# Patient Record
Sex: MALE | Race: OTHER
[De-identification: names, ages, dates, MRNs, and addresses within clinical notes are randomized per-mention and may not be internally consistent; named-entity substitution may affect disease eponyms.]

---

## 2018-12-20 ENCOUNTER — HOSPITAL ENCOUNTER (EMERGENCY)
Dept: HOSPITAL 61 - ER | Age: 33
Discharge: HOME | End: 2018-12-20
Payer: COMMERCIAL

## 2018-12-20 VITALS — HEIGHT: 65 IN | WEIGHT: 156 LBS | BODY MASS INDEX: 25.99 KG/M2

## 2018-12-20 VITALS — DIASTOLIC BLOOD PRESSURE: 57 MMHG | SYSTOLIC BLOOD PRESSURE: 120 MMHG
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
## 2018-12-20 DIAGNOSIS — Z90.49: ICD-10-CM

## 2018-12-20 DIAGNOSIS — S29.019A: Primary | ICD-10-CM

## 2018-12-20 PROCEDURE — 96375 TX/PRO/DX INJ NEW DRUG ADDON: CPT

## 2018-12-20 PROCEDURE — 96374 THER/PROPH/DIAG INJ IV PUSH: CPT

## 2018-12-20 PROCEDURE — 72072 X-RAY EXAM THORAC SPINE 3VWS: CPT

## 2018-12-20 PROCEDURE — 99283 EMERGENCY DEPT VISIT LOW MDM: CPT

## 2018-12-20 NOTE — RAD
THORACIC SPINE 3V

 

History: mid back pain after hanging a mirror this morning.<BR>patient 

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

 

Comparison: None are available

 

Mild marginal degenerative spurring. The upper thoracic vertebrae are not 

well seen on the lateral view. Vertebral body height appears maintained, 

particularly as seen on the frontal view. No significant subluxation. 

Visualized lungs and paraspinal soft tissues appear grossly unremarkable.

 

IMPRESSION: No acute radiographic findings.

 

Electronically signed by: Hugh Montes De Oca MD (12/20/2018 1:30 PM) [id number]

## 2018-12-20 NOTE — PHYS DOC
Past Medical History


Past Medical History:  No Pertinent History


Past Surgical History:  Cholecystectomy, Other


Additional Past Surgical Histo:  L ankle frx repair with hardware


Alcohol Use:  None


Drug Use:  None





Adult General


Chief Complaint


Chief Complaint:  BACK PAIN - NO INJURY





[facility]


HPI





Patient is a 32-year-old male who presents with complaint of midthoracic back 

pain that started yesterday. Patient indicates that he is in  and 

states that he has been doing a lot of training recently and also indicates 

that he has been thrown to the ground a few times. He states that his back was 

feeling fairly tight last night and again this morning. He states that while at 

work he went to  a mirror that he states weighed only about 10 pounds 

and he went to [facility] and at that point he felt a sharp stab of pain in 

his back. He denies any loss of bowel or bladder function. He states that pain 

is worsened with movement and with deep breathing. He rates pain at an 8 out of 

10. He denies any nausea or vomiting.





Review of Systems


Review of Systems





Constitutional: Denies fever or chills []


Respiratory: Denies cough or shortness of breath []


Cardiovascular: No additional information not addressed in HPI []


GI: Denies abdominal pain, nausea, vomiting or diarrhea []


Musculoskeletal: Complains of midthoracic back pain []


Integument: Denies rash or skin lesions []


Neurologic: Denies headache, focal weakness or sensory changes []





All other systems were reviewed and found to be within normal limits, except as 

documented in this note.





Current Medications


Current Medications





Current Medications








 Medications


  (Trade)  Dose


 Ordered  Sig/[facility]  Start Time


 Stop Time Status Last Admin


Dose Admin


 


 Butorphanol


 Tartrate


  (Stadol)  1 mg  1X  ONCE  12/20/18 11:45


 12/20/18 11:46 DC 12/20/18 12:21


1 MG


 


 Ketorolac


 Tromethamine


  (Toradol 30mg


 Vial)  30 mg  1X  ONCE  12/20/18 11:45


 12/20/18 11:46 DC 12/20/18 12:27


30 MG


 


 Orphenadrine


 Citrate


  (Norflex)  60 mg  1X  ONCE  12/20/18 11:45


 12/20/18 11:46 DC 12/20/18 12:27


60 MG











Allergies


Allergies





Allergies








Coded Allergies Type Severity Reaction Last Updated Verified


 


  No Known Drug Allergies    12/20/18 No











Physical Exam


Physical Exam





Constitutional: Well developed, well nourished, no acute distress, non-toxic 

appearance. []


HENT: Normocephalic, atraumatic, bilateral external ears normal, oropharynx 

moist, no oral exudates, nose normal. []


Eyes: PERRLA, EOMI, conjunctiva normal, no discharge. [] 


Neck: Normal range of motion, no tenderness, supple, no stridor. [] 


Cardiovascular: Regular rate and rhythm no murmur []


Lungs & Thorax:  Bilateral breath sounds clear to auscultation []


Abdomen: Bowel sounds normal, soft, no tenderness. [] 


Skin: Warm, dry, no erythema, no rash. [] 


Back: There is moderate tenderness to palpation with palpable spasm most 

notably at the mid thoracic region on the right. [] 


Extremities: No tenderness, no cyanosis, no clubbing, ROM intact, no edema. [] 


Neurologic: Alert and oriented X 3, normal motor function, normal sensory 

function, no focal deficits noted. []





Current Patient Data


Vital Signs





 Vital Signs








  Date Time  Temp Pulse Resp B/P (MAP) Pulse Ox O2 Delivery O2 Flow Rate FiO2


 


12/20/18 12:21   16  99 Room Air  


 


12/20/18 10:48 97.6 53  120/57 (78)    





 97.6       











EKG


EKG


[]





Radiology/Procedures


Radiology/Procedures


[]


Impressions:


THORACIC SPINE 3V


 


History: mid back pain after hanging a mirror this morning.<BR>patient 


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


 


Comparison: None are available


 


Mild marginal degenerative spurring. The upper thoracic vertebrae are not 


well seen on the lateral view. Vertebral body height appears maintained, 


particularly as seen on the frontal view. No significant subluxation. 


Visualized lungs and paraspinal soft tissues appear grossly unremarkable.


 


IMPRESSION: No acute radiographic findings.





Course & Med Decision Making


Course & Med Decision Making


Pertinent Labs and Imaging studies reviewed. (See chart for details)





[]





Dragon Disclaimer


Dragon Disclaimer


This electronic medical record was generated, in whole or in part, using a 

voice recognition dictation system.





Departure


Departure


Impression:  


 Primary Impression:  


 Acute thoracic myofascial strain


Disposition:  01 HOME, SELF-CARE


Condition:  STABLE


Referrals:  


ELIZABETH HOPE MD (PCP)


Patient Instructions:  Thoracic Strain


Scripts


Diclofenac Sodium (DICLOFENAC SODIUM) 50 Mg Tablet.dr


1 TAB PO BID PRN for PAIN, #20 TAB


   Prov: GEREMIAS ALVAREZ Jr. DO         12/20/18 


Orphenadrine Citrate (ORPHENADRINE CITRATE) 100 Mg Tablet.er


1 TAB PO BID PRN for MUSCLE SPASMS, #20 TAB


   Prov: GEREMIAS ALVAREZ Jr. DO         12/20/18 


Hydrocodone/Apap 5-325 (NORCO 5-325 TABLET) 1 Each Tablet


1 EACH PO PRN Q6HRS PRN for PAIN, #15


   as needed for pain


   Prov: GEREMIAS ALVAREZ Jr. DO         12/20/18





Problem Qualifiers








 Primary Impression:  


 Acute thoracic myofascial strain


 Encounter type:  initial encounter  Qualified Codes:  S29.019A - Strain of 

muscle and tendon of unspecified wall of thorax, initial encounter








GEREMIAS ALVAREZ Jr. DO Dec 20, 2018 13:05

## 2020-10-04 ENCOUNTER — HOSPITAL ENCOUNTER (EMERGENCY)
Dept: HOSPITAL 63 - ER | Age: 35
Discharge: HOME | End: 2020-10-04
Payer: COMMERCIAL

## 2020-10-04 VITALS — SYSTOLIC BLOOD PRESSURE: 110 MMHG | DIASTOLIC BLOOD PRESSURE: 38 MMHG

## 2020-10-04 VITALS — BODY MASS INDEX: 25.51 KG/M2 | WEIGHT: 158.73 LBS | HEIGHT: 66 IN

## 2020-10-04 DIAGNOSIS — M54.41: Primary | ICD-10-CM

## 2020-10-04 PROCEDURE — 96372 THER/PROPH/DIAG INJ SC/IM: CPT

## 2020-10-04 PROCEDURE — 99284 EMERGENCY DEPT VISIT MOD MDM: CPT

## 2020-10-04 NOTE — PHYS DOC
Past History


Past Medical History:  No Pertinent History


Past Surgical History:  No Surgical History


Alcohol Use:  None





General Adult


EDM:


Chief Complaint:  LOWER EXT PAIN





HPI:


HPI:





History is obtained from the patient.  Patient is a 34-year-old male with no 

reported PMH who presents with complaint of right lower back pain.  Patient 

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  

He states the pain began 2 days ago after a strenuous workout.  He states the 

pain began as a dull cramping pain in his right lower back.  He states the 

following morning after waking up he noted intense cramping and had significant 

difficulty getting out of bed.  He states he was able to go to work however 

where he works at [employer].  States pain is 

sharp in nature radiates down his right leg.  States he is able to ambulate and 

bend his leg although it is somewhat painful.  Denies abdominal pain.  Denies 

midline back pain.  Denies any syncope.  Denies fevers or vomiting.  Denies 

chest pain or shortness of breath.  Not tried any medication prior to arrival.  

Denies direct trauma to the back through his training or any other activity.





Patient denies any urinary retention, stool incontinence, saddle anesthesia, 

history of IV drug use, or history of cancer.





Review of Systems:


Review of Systems:


Constitutional:  Denies fever or chills 


Eyes:  Denies change in visual acuity 


HENT:  Denies nasal congestion or sore throat 


Respiratory:  Denies cough or shortness of breath 


Cardiovascular:  Denies chest pain or edema 


GI:  Denies abdominal pain, nausea, vomiting, bloody stools or diarrhea 


: Denies dysuria 


Musculoskeletal: Positive for back pain


Integument:  Denies rash 


Neurologic:  Denies headache, focal weakness or sensory changes 


Endocrine:  Denies polyuria or polydipsia 


Lymphatic:  Denies swollen glands 


Psychiatric:  Denies depression or anxiety





Heart Score:


Risk Factors:


Risk Factors:  DM, Current or recent (<one month) smoker, HTN, HLP, family 

history of CAD, obesity.


Risk Scores:


Score 0 - 3:  2.5% MACE over next 6 weeks - Discharge Home


Score 4 - 6:  20.3% MACE over next 6 weeks - Admit for Clinical Observation


Score 7 - 10:  72.7% MACE over next 6 weeks - Early Invasive Strategies





Current Medications:


Current Meds:





Current Medications








 Medications


  (Trade)  Dose


 Ordered  Sig/[facility]  Start Time


 Stop Time Status Last Admin


Dose Admin


 


 Acetaminophen/


 Hydrocodone Bitart


  (Lortab 5/325)  1 tab  1X  ONCE  10/4/20 15:30


 10/4/20 15:31 DC  





 


 Cyclobenzaprine


 HCl


  (Flexeril)  5 mg  1X  ONCE  10/4/20 15:30


 10/4/20 15:31 DC  





 


 Ketorolac


 Tromethamine


  (Toradol 30mg


 Vial)  30 mg  1X  ONCE  10/4/20 15:30


 10/4/20 15:31 DC  





 


 Lidocaine


  (Lidoderm)  1 patch  1X  10/4/20 15:30


     














Allergies:


Allergies:





Allergies








Coded Allergies Type Severity Reaction Last Updated Verified


 


  No Known Drug Allergies    10/4/20 No











Physical Exam:


PE:





Constitutional: Well developed, well nourished, no acute distress, non-toxic 

appearance. []


HENT: Normocephalic, atraumatic, bilateral external ears normal, oropharynx 

moist, no oral exudates, nose normal. []


Eyes: PERRLA, EOMI, conjunctiva normal, no discharge. [] 


Neck: Normal range of motion, no tenderness, supple, no stridor. [] 


Cardiovascular:Heart rate regular rhythm, no murmur []


Lungs & Thorax:  Bilateral breath sounds clear to auscultation []


Abdomen: , soft, no tenderness, no masses, no pulsatile masses. [] 


Skin: Warm, dry, no erythema, no rash. [] 


Back: + 5/5 motor strength in dorsiflexion and plantarflexion of the great toes 

bilaterally.  Sensation intact between the webbing of the first and second toes 

bilaterally.  Hypertonic musculature palpated in the right paraspinal tissue in 

the lumbar region.  Reproducible tenderness.  Positive straight leg test.  No 

midline tenderness, step-offs, or deformities.


Extremities: No tenderness, no cyanosis, no clubbing, ROM intact, no edema. [] 


Neurologic: Alert and oriented X 3, normal motor function, normal sensory 

function, no focal deficits noted. []


Psychologic: Affect normal, judgement normal, mood normal. []





Current Patient Data:


Vital Signs:





                                   Vital Signs








  Date Time  Temp Pulse Resp B/P (MAP) Pulse Ox O2 Delivery O2 Flow Rate FiO2


 


10/4/20 14:26 98.8 57 16 110/38 (62) 100 Room Air  











EKG:


EKG:


[]





Radiology/Procedures:


Radiology/Procedures:


[]





Course & Med Decision Making:


Course & Med Decision Making


Pertinent Labs and Imaging studies reviewed. (See chart for details)





[] Overall patient is well-appearing 34-year-old male who presents with chief 

complaint of right lower back pain.  No red flag signs or symptoms regarding his

 back pain.  Is likely due to his intense workouts.  Given the lack of red flag 

signs and symptoms or trauma CT imaging will be deferred.  Denies urinary 

symptoms.  Urinalysis will be deferred.  This likely musculoskeletal nature 

based on history and exam.  Patient was given medications in the emergency 

department and did show significant relief.  He was able to ambulate without 

difficulty.  His repeat examination remains benign.  I do feel his appropriate 

for discharge home.  He will be given a primary care doctor to follow-up with.  

Return precautions discussed and understood.  Counseled on back pain exercises. 

 Stable for discharge home.





Dragon Disclaimer:


Dragon Disclaimer:


This electronic medical record was generated, in whole or in part, using a voice

 recognition dictation system.





Departure


Departure:


Impression:  


   Primary Impression:  


   Low back pain


   Qualified Codes:  M54.41 - Lumbago with sciatica, right side


Disposition:  01 HOME/RESIDENCE PRIOR TO ADM


Condition:  STABLE


Referrals:  


ELIZABETH HOPE MD (PCP)


Patient Instructions:  Back Exercises





Additional Instructions:  


Please follow-up your primary care physician in the next 2 to 3 days.


Scripts


Lidocaine (Lidocaine PATCH **) 1 Each Adh..patch


1 EACH TP DAILY for FOR LOCAL PAIN for 5 Days, #5 PATCH


   REMOVE AFTER 12 HOURS. 4%


   Prov: GRIFFIN CLARKE DO         10/4/20 


Ibuprofen (IBUPROFEN) 200 Mg Tablet


600 MG PO QIDPRN PRN for PAIN, #15 TAB


   Prov: GRIFFIN CLARKE DO         10/4/20 


Cyclobenzaprine Hcl (CYCLOBENZAPRINE HCL) 10 Mg Tablet


1 TAB PO TID PRN PRN for PAIN, #12 TAB


   Prov: GRIFFIN CLARKE DO         10/4/20











GRIFFIN CLARKE DO           Oct 4, 2020 15:35

## 2020-10-08 ENCOUNTER — HOSPITAL ENCOUNTER (EMERGENCY)
Dept: HOSPITAL 63 - ER | Age: 35
Discharge: HOME | End: 2020-10-08
Payer: COMMERCIAL

## 2020-10-08 VITALS — SYSTOLIC BLOOD PRESSURE: 106 MMHG | DIASTOLIC BLOOD PRESSURE: 66 MMHG

## 2020-10-08 VITALS — WEIGHT: 164.02 LBS | BODY MASS INDEX: 26.36 KG/M2 | HEIGHT: 66 IN

## 2020-10-08 DIAGNOSIS — M54.41: Primary | ICD-10-CM

## 2020-10-08 DIAGNOSIS — M47.817: ICD-10-CM

## 2020-10-08 DIAGNOSIS — M79.661: ICD-10-CM

## 2020-10-08 DIAGNOSIS — R91.1: ICD-10-CM

## 2020-10-08 PROCEDURE — 93971 EXTREMITY STUDY: CPT

## 2020-10-08 PROCEDURE — 72128 CT CHEST SPINE W/O DYE: CPT

## 2020-10-08 PROCEDURE — 72131 CT LUMBAR SPINE W/O DYE: CPT

## 2020-10-08 PROCEDURE — 99285 EMERGENCY DEPT VISIT HI MDM: CPT

## 2020-10-08 NOTE — RAD
CT LUMBAR SPINE WO CONTRAST, CT THORACIC SPINE WO CONTRAST

 

History:Reason: low back pain. numbness radiating down R leg. / Spl. 

Instructions:  / History: 

 

Technique: Noncontrast CT was performed of the lumbar spine. Multiplanar 

reconstructions were performed.

 

Exposure: One or more of the following individualized dose reduction 

techniques were utilized for this examination:  

1. Automated exposure control  

2. Adjustment of the mA and/or kV according to patient size  

3. Use of iterative reconstruction technique.

 

Comparison: None

 

Findings:

Thoracic spine CT:

Normal vertebral body height and alignment. No fracture.

 

Mild multilevel degenerative disc changes most prominent T10-T11 and 

T11-T12. No high-grade canal or neuroforaminal narrowing.

 

5 mm right lower lobe pulmonary nodule (series 2 image 70). Prior 

cholecystectomy.

 

CT lumbar spine:

Minimal retrolisthesis L5 on S1. Normal vertebral body height. No 

fracture.

 

T12-L1:  No canal or neuroforaminal narrowing.

 

L1-L2:  No canal or neuroforaminal narrowing.

 

L2-L3:  Minimal disc bulge. No canal or neuroforaminal narrowing.

 

L3-L4:  Minimal disc bulge. No canal or neuroforaminal narrowing.

 

L4-L5:  Left central disc protrusion. Mild left subarticular recess 

narrowing with probable abutment of the descending left L5 nerve root.. No

canal narrowing. Mild facet arthropathy. Ligamentum flavum thickening. No 

neuroforaminal narrowing.

 

L5-S1:  Broad-based central disc bulge. Mild facet arthropathy. Bilateral 

subarticular recess narrowing with probable abutment of the descending S1 

nerve roots. Minimal canal narrowing. No neuroforaminal narrowing.

 

Impression:

Thoracic spine CT:

1.  Mild multilevel thoracic spondylosis.

2.  Small right lower lobe pulmonary nodule. Recommend one-year follow-up 

if high risk.

 

Lumbar spine CT:

1.  Mild lumbar spondylosis most prominent L4-L5 and L5-S1 with 

subarticular recess narrowing. Correlate for radiculopathy. MRI can better

evaluate as clinically warranted.

 

Electronically signed by: Cali Candelaria DO (10/8/2020 11:59 AM) [id number]

## 2020-10-08 NOTE — PHYS DOC
Past History


Past Medical History:  No Pertinent History


Past Surgical History:  Cholecystectomy, Other


Additional Past Surgical Histo:  RIGHT ANKLE


Alcohol Use:  None





General Adult


EDM:


Chief Complaint:  BACK PAIN OR INJURY





HPI:


HPI:





History obtained for the patient.  Patient is a 34-year-old male with no 

reported PMH who presents with chief complaint of right lower back pain.  

Patient states he has had the pain for the past week.  He states that he was 

seen in our emergency department approximately 6 days ago for similar issue.  

States he was discharged home with medication that helped temporarily.  He 

states he did follow-up with his primary care physician yesterday who did give 

him an injection of some sort but did not help the pain.  States the pain is 

been persistent.  States that his right lower back pain that radiates down his 

right leg.  He does note associated paresthesias.  He states it is somewhat more

painful to ambulate or bend over.  He does note that he is an  and 

thinks he may have strained it while working out and training.  He states he has

been able to ambulate.  Denies abdominal pain.  Does note some right posterior 

calf pain.  Denies any leg swelling.  Denies any increase redness.  Denies any 

IV drug use.  Denies any trauma to the back directly.  His greatest complaint 

currently is that his back pain does not seem to be improving.  Denies fevers or

vomiting.  States pain is aching in nature.





Patient denies any urinary retention, stool incontinence, saddle anesthesia, 

history of IV drug use, or history of cancer..





Review of Systems:


Review of Systems:


Constitutional:  Denies fever or chills 


Eyes:  Denies change in visual acuity 


HENT:  Denies nasal congestion or sore throat 


Respiratory:  Denies cough or shortness of breath 


Cardiovascular:  Denies chest pain or edema 


GI:  Denies abdominal pain, nausea, vomiting, bloody stools or diarrhea 


: Denies dysuria 


Musculoskeletal: Positive for back pain


Integument:  Denies rash 


Neurologic:  Denies headache, focal weakness or sensory changes 


Endocrine:  Denies polyuria or polydipsia 


Lymphatic:  Denies swollen glands 


Psychiatric:  Denies depression or anxiety





Heart Score:


Risk Factors:


Risk Factors:  DM, Current or recent (<one month) smoker, HTN, HLP, family histo

ry of CAD, obesity.


Risk Scores:


Score 0 - 3:  2.5% MACE over next 6 weeks - Discharge Home


Score 4 - 6:  20.3% MACE over next 6 weeks - Admit for Clinical Observation


Score 7 - 10:  72.7% MACE over next 6 weeks - Early Invasive Strategies





Current Medications:


Current Meds:





Current Medications








 Medications


  (Trade)  Dose


 Ordered  Sig/[facility]  Start Time


 Stop Time Status Last Admin


Dose Admin


 


 Cyclobenzaprine


 HCl


  (Flexeril)  10 mg  1X  ONCE  10/8/20 11:15


 10/8/20 11:16 DC 10/8/20 11:40


10 MG


 


 Ketorolac


 Tromethamine


  (Toradol 30mg


 Vial)  30 mg  1X  ONCE  10/8/20 11:15


 10/8/20 11:16 DC 10/8/20 11:41


30 MG


 


 Lidocaine


  (Lidoderm)  1 patch  1X  10/8/20 11:15


     














Allergies:


Allergies:





Allergies








Coded Allergies Type Severity Reaction Last Updated Verified


 


  No Known Drug Allergies    10/4/20 No











Physical Exam:


PE:





Constitutional: Well developed, well nourished, no acute distress, non-toxic 

appearance. []


HENT: Normocephalic, atraumatic, bilateral external ears normal, oropharynx 

moist, no oral exudates, nose normal. []


Eyes: PERRLA, EOMI, conjunctiva normal, no discharge. [] 


Neck: Normal range of motion, no tenderness, supple, no stridor. [] 


Cardiovascular:Heart rate regular rhythm, no murmur []


Lungs & Thorax:  Bilateral breath sounds clear to auscultation []


Abdomen: Right-sided associated hypertonic musculature appreciated.  Soft, no 

tenderness, no masses, no pulsatile masses. [] 


Skin: Warm, dry, no erythema, no rash. [] 


Back: No cervical, thoracic, or lumbar midline tenderness palpation, step-offs, 

or deformities.  Positive straight leg test on the right.  + 5/5 motor strength 

in dorsiflexion and plantarflexion of the great toes bilaterally.  Sensation 

intact between the webbing of the first and second toes bilaterally.


Extremities: No tenderness, no cyanosis, no clubbing, ROM intact, no edema.  

Strong +2-4 DP pulses bilaterally.  No coolness to touch.  Brisk capillary 

refill.  [] 


Neurologic: Alert and oriented X 3, normal motor function, normal sensory 

function, no focal deficits noted. []


Psychologic: Affect normal, judgement normal, mood normal. []





Current Patient Data:


Vital Signs:





                                   Vital Signs








  Date Time  Temp Pulse Resp B/P (MAP) Pulse Ox O2 Delivery O2 Flow Rate FiO2


 


10/8/20 11:00 97.9 85 20 106/66 (79) 98 Room Air  











EKG:


EKG:


[]





Radiology/Procedures:


Radiology/Procedures:


[facility] [address], [place] [address]


                                 [phone]


                                        


                                 IMAGING REPORT





                                     Signed





PATIENT: SAWYER HODGE   ACCOUNT: [id number]     MRN#: [id number]


: 1985           LOCATION: ER              AGE: 34


SEX: M                    EXAM DT: 10/08/20         ACCESSION#: [id number]


STATUS: REG ER            ORD. PHYSICIAN: GRIFFIN CLARKE DO


REASON: low back pain. numbness radiating down R leg. 


PROCEDURE: CT LUMBAR SPINE WO CONTRAST





CT LUMBAR SPINE WO CONTRAST, CT THORACIC SPINE WO CONTRAST


 


History:Reason: low back pain. numbness radiating down R leg. / Spl. 


Instructions:  / History: 


 


Technique: Noncontrast CT was performed of the lumbar spine. Multiplanar 


reconstructions were performed.


 


Exposure: One or more of the following individualized dose reduction 


techniques were utilized for this examination:  


1. Automated exposure control  


2. Adjustment of the mA and/or kV according to patient size  


3. Use of iterative reconstruction technique.


 


Comparison: None


 


Findings:


Thoracic spine CT:


Normal vertebral body height and alignment. No fracture.


 


Mild multilevel degenerative disc changes most prominent T10-T11 and 


T11-T12. No high-grade canal or neuroforaminal narrowing.


 


5 mm right lower lobe pulmonary nodule (series 2 image 70). Prior 


cholecystectomy.


 


CT lumbar spine:


Minimal retrolisthesis L5 on S1. Normal vertebral body height. No 


fracture.


 


T12-L1:  No canal or neuroforaminal narrowing.


 


L1-L2:  No canal or neuroforaminal narrowing.


 


L2-L3:  Minimal disc bulge. No canal or neuroforaminal narrowing.


 


L3-L4:  Minimal disc bulge. No canal or neuroforaminal narrowing.


 


L4-L5:  Left central disc protrusion. Mild left subarticular recess 


narrowing with probable abutment of the descending left L5 nerve root.. No


canal narrowing. Mild facet arthropathy. Ligamentum flavum thickening. No 


neuroforaminal narrowing.


 


L5-S1:  Broad-based central disc bulge. Mild facet arthropathy. Bilateral 


subarticular recess narrowing with probable abutment of the descending S1 


nerve roots. Minimal canal narrowing. No neuroforaminal narrowing.


 


Impression:


Thoracic spine CT:


1.  Mild multilevel thoracic spondylosis.


2.  Small right lower lobe pulmonary nodule. Recommend one-year follow-up 


if high risk.


 


Lumbar spine CT:


1.  Mild lumbar spondylosis most prominent L4-L5 and L5-S1 with 


subarticular recess narrowing. Correlate for radiculopathy. MRI can better


evaluate as clinically warranted.


 


Electronically signed by: Cali Candelaria DO (10/8/2020 11:59 AM) [id number]














DICTATED AND SIGNED BY:     CALI CANDELARIA DO


DATE:     10/08/20 0333





CC: ELIZABETH HOPE MD; GRIFFIN CLARKE DO ~


[facility] [address], [place] [address]


                                 [phone]


                                        


                                 IMAGING REPORT





                                     Signed





PATIENT: SAWYER HODGE   ACCOUNT: [id number]     MRN#: [id number]


: 1985           LOCATION: ER              AGE: 34


SEX: M                    EXAM DT: 10/08/20         ACCESSION#: [id number]


STATUS: PRE ER            ORD. PHYSICIAN: GRIFFIN CLARKE DO


REASON: RLE pain


PROCEDURE: VENOUS LOWER EXTREMITY RIGHT





VENOUS LOWER EXTREMITY RIGHT


 


History: Reason: RLE pain / Spl. Instructions:  / History: 


 


Comparison:  None.


 


Discussion: 


 


Multiple longitudinal and transverse high resolution real-time images of 


the venous system of right lower extremity were obtained with color and 


Doppler sampling. The common femoral, superficial femoral, popliteal and 


proximal calf veins are all patent and demonstrate normal flow and 


compressibility. Normal respiratory phasicity and augmentation is present.


 


Mildly prominent right inguinal lymph nodes, likely reactive.


 


Impression: 


1.  No evidence of deep vein thrombosis.


 


Electronically signed by: Cali Candelaria DO (10/8/2020 11:35 AM) [id number]














DICTATED AND SIGNED BY:     CALI CANDELARIA DO


DATE:     10/08/20 3365





CC: ELIZABETH HOPE MD; GRIFFIN CLARKE DO ~


[]





Course & Med Decision Making:


Course & Med Decision Making


Pertinent Labs and Imaging studies reviewed. (See chart for details)





[] Patient is an overall well-appearing 34-year-old male who presents with chief

 complaint of right lower back pain that radiates down his right leg.  He notes 

associated paresthesias.  Low suspicion for vascular etiology.  Strong palpable 

+2-4 DP pulses bilaterally.  DVT study of the right lower extremity negative.  

CT imaging of the thoracic and lumbar spine did show spondylolysis of multiple 

levels concerning for associated radiculopathy.  This does appear consistent 

with the clinical picture.  Do not feel emergent MRI is indicated given he has 

no acute neurologic symptoms outside of paresthesias.  He has been able to 

ambulate.  Continues to deny any red flag signs or symptoms regarding his back 

pain.  He was c encouraged to continue to use supportive care at home.  I 

instructed him to follow-up with Dr. Castle again in the next 2 to 3 days 

which he states he can do.  He may benefit from outpatient MRI.  He will be 

given a short course of oral steroids for potential swelling.  Return 

precautions discussed and understood.  Stable for discharge home.





Dragon Disclaimer:


Dragon Disclaimer:


This electronic medical record was generated, in whole or in part, using a voice

 recognition dictation system.





Departure


Departure:


Impression:  


   Primary Impression:  


   Low back pain


   Qualified Codes:  M54.41 - Lumbago with sciatica, right side


   Additional Impressions:  


   Spondylosis


   Pulmonary nodule


Disposition:   HOME SELF CARE/HOMELESS


Condition:  STABLE


Referrals:  


ELIZABETH HOPE MD (PCP)


Patient Instructions:  Spondylolysis with [facility]





Additional Instructions:  


Please follow-up with your primary care physician in the next 2 to 3 days.


Scripts


Methylprednisolone (METHYLPREDNISOLONE) 8 Mg Tablet


8 MG PO DAILY for pain, #36 TAB


   Prov: GRIFFIN CLARKE DO         10/8/20











GRIFFIN CLARKE DO           Oct 8, 2020 12:17

## 2020-10-08 NOTE — RAD
CT LUMBAR SPINE WO CONTRAST, CT THORACIC SPINE WO CONTRAST

 

History:Reason: low back pain. numbness radiating down R leg. / Spl. 

Instructions:  / History: 

 

Technique: Noncontrast CT was performed of the lumbar spine. Multiplanar 

reconstructions were performed.

 

Exposure: One or more of the following individualized dose reduction 

techniques were utilized for this examination:  

1. Automated exposure control  

2. Adjustment of the mA and/or kV according to patient size  

3. Use of iterative reconstruction technique.

 

Comparison: None

 

Findings:

Thoracic spine CT:

Normal vertebral body height and alignment. No fracture.

 

Mild multilevel degenerative disc changes most prominent T10-T11 and 

T11-T12. No high-grade canal or neuroforaminal narrowing.

 

5 mm right lower lobe pulmonary nodule (series 2 image 70). Prior 

cholecystectomy.

 

CT lumbar spine:

Minimal retrolisthesis L5 on S1. Normal vertebral body height. No 

fracture.

 

T12-L1:  No canal or neuroforaminal narrowing.

 

L1-L2:  No canal or neuroforaminal narrowing.

 

L2-L3:  Minimal disc bulge. No canal or neuroforaminal narrowing.

 

L3-L4:  Minimal disc bulge. No canal or neuroforaminal narrowing.

 

L4-L5:  Left central disc protrusion. Mild left subarticular recess 

narrowing with probable abutment of the descending left L5 nerve root.. No

canal narrowing. Mild facet arthropathy. Ligamentum flavum thickening. No 

neuroforaminal narrowing.

 

L5-S1:  Broad-based central disc bulge. Mild facet arthropathy. Bilateral 

subarticular recess narrowing with probable abutment of the descending S1 

nerve roots. Minimal canal narrowing. No neuroforaminal narrowing.

 

Impression:

Thoracic spine CT:

1.  Mild multilevel thoracic spondylosis.

2.  Small right lower lobe pulmonary nodule. Recommend one-year follow-up 

if high risk.

 

Lumbar spine CT:

1.  Mild lumbar spondylosis most prominent L4-L5 and L5-S1 with 

subarticular recess narrowing. Correlate for radiculopathy. MRI can better

evaluate as clinically warranted.

 

Electronically signed by: Cali Candelaria DO (10/8/2020 11:59 AM) [id number]

## 2020-10-08 NOTE — RAD
VENOUS LOWER EXTREMITY RIGHT

 

History: Reason: RLE pain / Spl. Instructions:  / History: 

 

Comparison:  None.

 

Discussion: 

 

Multiple longitudinal and transverse high resolution real-time images of 

the venous system of right lower extremity were obtained with color and 

Doppler sampling. The common femoral, superficial femoral, popliteal and 

proximal calf veins are all patent and demonstrate normal flow and 

compressibility. Normal respiratory phasicity and augmentation is present.

 

Mildly prominent right inguinal lymph nodes, likely reactive.

 

Impression: 

1.  No evidence of deep vein thrombosis.

 

Electronically signed by: Cali Candelaria DO (10/8/2020 11:35 AM) [id number]

## 2021-04-11 ENCOUNTER — HOSPITAL ENCOUNTER (EMERGENCY)
Dept: HOSPITAL 63 - ER | Age: 36
Discharge: HOME | End: 2021-04-11
Payer: COMMERCIAL

## 2021-04-11 VITALS — WEIGHT: 155.21 LBS | HEIGHT: 66 IN | BODY MASS INDEX: 24.94 KG/M2

## 2021-04-11 VITALS
DIASTOLIC BLOOD PRESSURE: 65 MMHG | SYSTOLIC BLOOD PRESSURE: 123 MMHG | SYSTOLIC BLOOD PRESSURE: 123 MMHG | DIASTOLIC BLOOD PRESSURE: 65 MMHG

## 2021-04-11 DIAGNOSIS — L03.116: Primary | ICD-10-CM

## 2021-04-11 PROCEDURE — 73562 X-RAY EXAM OF KNEE 3: CPT

## 2021-04-11 PROCEDURE — 73590 X-RAY EXAM OF LOWER LEG: CPT

## 2021-04-11 RX ADMIN — ACETAMINOPHEN ONE MG: 500 TABLET ORAL at 10:15

## 2021-04-11 RX ADMIN — CEPHALEXIN ONE MG: 250 CAPSULE ORAL at 11:46

## 2021-04-11 RX ADMIN — HYDROCODONE BITARTRATE AND ACETAMINOPHEN ONE TAB: 5; 325 TABLET ORAL at 10:44

## 2021-04-11 NOTE — RAD
Exam performed: 2  views left tibia fibula and 3 views left  knee. 



Clinical indication: Pain



Date of Service: 4/11/2021 Comparison: None available



Findings:



Normal alignment of the medial and lateral tibiofemoral joint is preserved. The patellofemoral joint 
appears unremarkable. The articular margins are smooth. There is no fracture or dislocation. Evidence
 of calcific loose body or joint effusion is absent.



AP and lateral viewd left tibia-fibula are obtained. Normal alignment of the knee joint is preserved.
 There are postoperative changes about the distal fibula with sideplate and several screws causing ar
throdesis at the distal tibiofibular joint.



Impression: 



1. No acute radiographic abnormality seen in the left knee.

2. No acute abnormality seen in the left tibia-fibula



Electronically signed by: Rachelle Bella MD (4/11/2021 10:43 AM) [facility]

## 2021-04-11 NOTE — PHYS DOC
Past History


Past Medical History:  No Pertinent History


Past Surgical History:  Other


Additional Past Surgical Histo:  left ankle


Alcohol Use:  None





Adult General


Chief Complaint


Chief Complaint:  LOWER EXTREMITY SWELLING





HPI


HPI





Patient is a healthy 35-year-old male who presents for left leg pain.  Onset was

on Friday, reports he was at mixed martial arts practice and got kicked in the 

left lateral portion of his leg.  Ever since, he has had focal nonradiating pain

to medial portion of left knee.  States he was able to work over the weekend at 

[employer] but reports increased pain, erythema, development of 

heat and tenderness with palpation.  He has no known medical issues, takes no 

medications on a daily basis besides vitamins.  No history of MRSA.  Has no 

crepitus, motor or sensory function loss, no neurologic symptoms





Review of Systems


Review of Systems


Fourteen body systems of review of systems have been reviewed. See HPI for 

pertinent positives and negative responses, other wise all other systems are 

negative, non-pertinent or non-contributory





Allergies


Allergies





Allergies








Coded Allergies Type Severity Reaction Last Updated Verified


 


  No Known Drug Allergies    10/4/20 No











Physical Exam


Physical Exam


Constitutional: Well developed, well nourished, no acute distress, non-toxic 

appearance. 


HENT: Normocephalic, atraumatic, bilateral external ears normal, oropharynx 

moist, no oral exudates, nose normal. 


Eyes: PERRLA, EOMI, conjunctiva normal, no discharge.  


Neck: Normal range of motion, no tenderness, supple, no stridor.  


Cardiovascular: Heart rate regular, sinus rhythm, no murmurs rubs or gallops


Lungs & Thorax:  Bilateral breath sounds clear to auscultation 


Abdomen: Bowel sounds normal, soft, no tenderness, no masses, no pulsatile 

masses.  Nonsurgical abdomen, no peritoneal signs


Skin: Warm, dry, erythematous region medial and inferior to actual knee joint 

without any palpable abnormalities of the knee with various anterior posterior 

Lachman test, valgus varus strain, no crepitus, no obvious effusion.  Skin 

findings consistent with cellulitis given presentation and symptoms.


Back: No tenderness, no CVA tenderness.  


Extremities: No tenderness, no cyanosis, no clubbing, ROM intact, no edema.  

Legs equal in diameter, no tenderness to palpation of calf muscles/negative 

Homans' sign


Neurologic: Alert and oriented X 3, grossly normal motor & sensory function, no 

focal deficits noted. 


Psychologic: Affect normal, judgement normal, mood normal.





Current Patient Data


Vital Signs





                                   Vital Signs








  Date Time  Temp Pulse Resp B/P (MAP) Pulse Ox O2 Delivery O2 Flow Rate FiO2


 


4/11/21 09:25 99.2 97 16 123/65 (84) 98 Room Air  











EKG


EKG


[]





Radiology/Procedures


Radiology/Procedures





Exam performed: 2  views left tibia fibula and 3 views left  knee. 





Clinical indication: Pain





Date of Service: 4/11/2021 Comparison: None available





Findings:





Normal alignment of the medial and lateral tibiofemoral joint is preserved. The 

patellofemoral joint appears unremarkable. The articular margins are smooth. 

There is no fracture or dislocation. Evidence of calcific loose body or joint 

effusion is absent.





AP and lateral viewd left tibia-fibula are obtained. Normal alignment of the 

knee joint is preserved. There are postoperative changes about the distal fibula

 with sideplate and several screws causing arthrodesis at the distal 

tibiofibular joint.





Impression: 





1. No acute radiographic abnormality seen in the left knee.


2. No acute abnormality seen in the left tibia-fibula





Electronically signed by: Rachelle Bella MD (4/11/2021 10:43 AM) [facility]











Heart Score


C/O Chest Pain:  N/A


Risk Factors:


Risk Factors:  DM, Current or recent (<one month) smoker, HTN, HLP, family 

history of CAD, obesity.


Risk Scores:


Risk Factors:  DM, Current or recent (<one month) smoker, HTN, HLP, family 

history of CAD, obesity.





Course & Med Decision Making


Course & Med Decision Making


Hemodynamically stable patient with history and physical examination consistent 

with left medial knee pain after traumatic injury during [facility] practice


Radiographs of left knee and lower extremity unremarkable.  Physical examination

 concerning for cellulitis.  Given location and symptoms, less likely blood clot

 versus other abnormality


Discussed ER visit with patient and several proposed plans of care.  Joint 

decision to treat with antibiotics with close PCP follow-up for repeat 

evaluation.  Discussed this might be an acute presentation more concerning 

pathology and so, advised strict follow-up in upcoming 72 hours


Patient has good access to care, states he will be able to see his primary care 

physician by then.  Strict return precautions were given to him with good 

understanding, all questions and concerns addressed prior to ER departure with 

antibiotics and extremely short-term narcotic pain prescription for severe pain





Dragon Disclaimer


Dragon Disclaimer


This electronic medical record was generated, in whole or in part, using a voice

 recognition dictation system.





Departure


Departure:


Impression:  


   Primary Impression:  


   Left leg cellulitis


Disposition:  01 DC HOME SELF CARE/HOMELESS


Condition:  STABLE


Referrals:  


ELIZABETH HOPE MD (PCP)


Patient Instructions:  Cellulitis





Additional Instructions:  


You were seen for an infection called cellulitis. You should lamont the area of 

redness when you get home.  If your redness spreads past the marked area at 24 

hours you should have it evaluated again.  You do not have an abscess right now 

but you could develop one.  If so you will need to have it drained.  As 

discussed, this might be an early presentation of something else such as a blood

 clot to your left lower extremity.  As such, it is prudent you follow-up with 

your primary care physician within upcoming 1 to 3 days for repeat evaluation.  

You should return to the ED immediately if you develop worsening pain, fever, 

swelling, redness, drainage, any sign of abscess, or any other new or concerning

 symptoms.   Take the entire course of antibiotics as prescribed.


Scripts


Hydrocodone/Acetaminophen (Hydrocodone-Acetamin 5-325 mg) 1 Each Tablet


1 EACH PO Q6H PRN for SEVERE PAIN 7-10, #7 TAB


   Prov: VIET SMITH DO         4/11/21 


Cephalexin (KEFLEX) 750 Mg Capsule


1 CAP PO TID for cellulitis for 7 Days, #21 CAP 0 Refills


   Prov: VIET SMITH DO         4/11/21











VIET SMITH DO                 Apr 11, 2021 10:13

## 2021-04-12 ENCOUNTER — HOSPITAL ENCOUNTER (OUTPATIENT)
Dept: HOSPITAL 63 - US | Age: 36
End: 2021-04-12
Attending: FAMILY MEDICINE
Payer: COMMERCIAL

## 2021-04-12 DIAGNOSIS — R60.9: ICD-10-CM

## 2021-04-12 DIAGNOSIS — I82.432: Primary | ICD-10-CM

## 2021-04-12 PROCEDURE — 93971 EXTREMITY STUDY: CPT

## 2021-04-12 NOTE — RAD
Exam Date:  4/12/2021 2:54 PM



US DPLX VENOUS EXTREMITY LOWER LT



Indication: Reason: LT LEG SWELLING, REDNESS AND PAIN / Spl. Instructions:  / History:  



INDICATION:  Lower extremity pain/swelling



TECHNIQUE:  Grayscale sonogram, color Doppler, and spectral Doppler waveform analysis of the lower ex
tremity venous system was performed on the left.     



FINDINGS:    



The left common femoral, superficial femoral, central greater saphenous, popliteal, posterior tibial 
and peroneal veins are compressible and demonstrate normal color Doppler flow and normal spontaneous 
phasic waveforms or normal response to augmentation.



There is a 5.6 x 2.6 x 1.3 cm ill-defined subcutaneous collection along the medial left calf proximal
ly.



IMPRESSION:  No evidence of deep venous thrombosis in the left lower extremity.



Subcutaneous collection along the medial left calf proximally is nonspecific but suggestive of a hema
rachel, given the provided history. Seroma or abscess would also be in the differential.



Electronically signed by: Christiano Ramírez MD (4/12/2021 3:31 PM) [id number]

## 2024-02-12 NOTE — RAD
Exam performed: 2  views left tibia fibula and 3 views left  knee. 



Clinical indication: Pain



Date of Service: 4/11/2021 Comparison: None available



Findings:



Normal alignment of the medial and lateral tibiofemoral joint is preserved. The patellofemoral joint 
appears unremarkable. The articular margins are smooth. There is no fracture or dislocation. Evidence
 of calcific loose body or joint effusion is absent.



AP and lateral viewd left tibia-fibula are obtained. Normal alignment of the knee joint is preserved.
 There are postoperative changes about the distal fibula with sideplate and several screws causing ar
throdesis at the distal tibiofibular joint.



Impression: 



1. No acute radiographic abnormality seen in the left knee.

2. No acute abnormality seen in the left tibia-fibula



Electronically signed by: Rachelle Bella MD (4/11/2021 10:43 AM) [facility] Please advise the  cultures are negative for gonorrhea and chlamydia